# Patient Record
Sex: MALE | Race: WHITE | URBAN - NONMETROPOLITAN AREA
[De-identification: names, ages, dates, MRNs, and addresses within clinical notes are randomized per-mention and may not be internally consistent; named-entity substitution may affect disease eponyms.]

---

## 2017-01-02 ENCOUNTER — HOSPITAL ENCOUNTER (OUTPATIENT)
Dept: PHYSICAL THERAPY | Facility: OTHER | Age: 57
Setting detail: THERAPIES SERIES
End: 2017-01-02
Attending: SPECIALIST

## 2017-04-06 ENCOUNTER — HISTORY (OUTPATIENT)
Dept: EMERGENCY MEDICINE | Facility: OTHER | Age: 57
End: 2017-04-06

## 2018-01-02 NOTE — DISCHARGE SUMMARY
Patient Information     Patient Name MRN Sex Lazaro Gonzalez [id number] Male 1960      Discharge Summaries by Viviane Call PT at 2017 12:57 PM     Author:  Viviane Call PT Service:  (none) Author Type:  PT- Physical Therapist     Filed:  2017  1:20 PM Date of Service:  2017 12:57 PM Status:  Signed     :  Viviane Call PT (PT- Physical Therapist)            [facility]  Outpatient PT - Discharge note    Date of Service: 2017   Visit #7    PSFS Visit:  7/10  PSFS Completed:  2016     Patient Name: Lazaro Liriano   YOB: 1960   Referring MD/Provider: Cirilo Israel NP  Medical and Treatment Diagnosis: s/p left knee arthroscopy, medial meniscectomy  PT Treatment Diagnosis: impaired knee range of motion and strength, gait impairment  Insurance: [employer]  Start of Service: 16  Certification Dates: Start of Service: 16  Medicare/MA Re-Cert Due: 17       Subjective        0/10 today. Kneeling on it this morning didn't bother. Squatting to floor is easier and denies difficulty with activities of daily living.    Returns to work on , without restrictions. Feels comfortable transitioning to independent HEP    Objective    Knee flexion range of motion supine: 135 degrees; 0 degrees ext   5/5 lower extremity strength throughout    single leg balance: 15 sec bilateral     Patient completed full squat to floor without difficulty    Today's Intervention:    Bike seat 5 level 7 x3 mins    Review of home exercise program x5 mins    Home Exercise Program:    Knee flexion seated and supine  Access Code: [email or web address]   URL: [email or web address]   Date: 2016   Prepared by: Viviane Call     Exercises   Lunge stretch to chair added 2017   Mini Squats at Table - 10-15 reps - 2-3 sets - 5 hold - 1x daily   Single Leg Stance - 1-2 minutes hold - 1x daily     Assessment    Patient demonstrates full knee range of  motion and lower extremity strength. He has met all goals and is independent in self management. Patient is discharged from further physical therapy at this time.    Patient will benefit from continued skilled physical therapy services to address aforementioned impairments and return patient to previous level of functioning.    Completed 12/13/2016:  Patient Specific Functional and Pain Scales (PSFS)  Clinician Instructions: Complete after the history and before the exam.   Initial Assessment:   We want to know what 3 activities in your life you are unable to perform, or are having the most difficulty performing, as a result of your chief problem. Please list and score at least 3 activities that you are unable to perform, or having the most difficulty performing, because of your chief problem.   Patient Specific Activity Scoring Scheme (score one number for each activity):   Activity Score (0-10)  0= Unable to perform activity  10= Able to perform activity at same level as before injury or problem Score (0-10)  0= Unable to perform activity  10= Able to perform activity at same level as before injury or problem  1/2/2017    1. stairs 7/10 10/10   2. squatting 2/10 10/10   3.  /10    4.  /10    5.  /10    Totals:  9/20= 45% ability, 55% impairment 100% ability, 0% impairment   Patient verbally states that they understand that the information they have provided above is current and complete to the best of their knowledge.   Patient Specific Functional Scale Modifier Scale Conversion: (patient's modifier that correlates with pt's score on PSFS): 10-CH (0% Impaired).    G codes and Modifier based on patient's presentation and clinical judgement:     Goal Primary G Code and Modifier:    The Patient's G Code Goal would be: Mobility    The Patient's Impairment, Limitation or Restriction Modifier goal would be best described as: CI - 1% - 20% Impairment.      Discharge Primary G Code and Modifier:      The Patient's  status upon Discharge is Mobility    The Patient's Impairment, Limitation or Restriction Modifier would be best described as CI - 1% - 20% Impairment.         Goals:  Patient goal:  Improve knee strength and motion in 8 weeks. Met 1/2/2017     Functional Short Term Goals (4 weeks):   Improve knee flexion to 135 degrees to allow squatting to retrieve items from the floor without difficulty. Met 1/2/2017   Demonstrate full knee extension to allow a normalized gait pattern. Met 1/2/2017     Functional Long Term Goals (8 weeks):   Improve strength to 5/5  to allow ability to get up off of floor without assistance and perform all work duties with minimal difficulty. Met 1/2/2017   Develop independent home program and symptom management techniques Met 1/2/2017       Plan    Patient is discharged at this time. Recommend continued home exercise program performance.    Thank you for your referral to [facility].  Please call with any questions, concerns or comments.  [phone]  Viviane Call, PT, DPT

## 2018-01-29 ENCOUNTER — DOCUMENTATION ONLY (OUTPATIENT)
Dept: FAMILY MEDICINE | Facility: OTHER | Age: 58
End: 2018-01-29

## 2018-01-29 RX ORDER — RIZATRIPTAN BENZOATE 10 MG/1
TABLET ORAL
COMMUNITY
Start: 2016-05-03

## 2018-01-29 RX ORDER — HYDROCODONE BITARTRATE AND ACETAMINOPHEN 5; 325 MG/1; MG/1
1 TABLET ORAL EVERY 4 HOURS PRN
COMMUNITY
Start: 2017-04-06

## 2018-01-29 RX ORDER — TRAZODONE HYDROCHLORIDE 50 MG/1
50 TABLET, FILM COATED ORAL AT BEDTIME
COMMUNITY
Start: 2016-03-08

## 2018-01-29 RX ORDER — CITALOPRAM HYDROBROMIDE 40 MG/1
40 TABLET ORAL DAILY
COMMUNITY
Start: 2016-03-28

## 2018-01-29 RX ORDER — HYDROCHLOROTHIAZIDE 25 MG/1
TABLET ORAL
COMMUNITY
Start: 2016-03-08